# Patient Record
Sex: FEMALE | Race: WHITE | NOT HISPANIC OR LATINO | Employment: UNEMPLOYED | URBAN - METROPOLITAN AREA
[De-identification: names, ages, dates, MRNs, and addresses within clinical notes are randomized per-mention and may not be internally consistent; named-entity substitution may affect disease eponyms.]

---

## 2017-10-07 ENCOUNTER — APPOINTMENT (EMERGENCY)
Dept: RADIOLOGY | Facility: HOSPITAL | Age: 62
End: 2017-10-07
Payer: MEDICARE

## 2017-10-07 ENCOUNTER — HOSPITAL ENCOUNTER (EMERGENCY)
Facility: HOSPITAL | Age: 62
Discharge: HOME | End: 2017-10-07
Attending: EMERGENCY MEDICINE | Admitting: EMERGENCY MEDICINE
Payer: MEDICARE

## 2017-10-07 VITALS
TEMPERATURE: 98.5 F | HEART RATE: 71 BPM | SYSTOLIC BLOOD PRESSURE: 130 MMHG | WEIGHT: 145 LBS | RESPIRATION RATE: 16 BRPM | OXYGEN SATURATION: 99 % | DIASTOLIC BLOOD PRESSURE: 62 MMHG

## 2017-10-07 DIAGNOSIS — J06.9 VIRAL URI: Primary | ICD-10-CM

## 2017-10-07 DIAGNOSIS — B97.89 VIRAL SINUSITIS: ICD-10-CM

## 2017-10-07 DIAGNOSIS — J32.9 VIRAL SINUSITIS: ICD-10-CM

## 2017-10-07 PROCEDURE — 71020 HB CHEST X-RAY 2VW FRONTAL&LATL: CPT

## 2017-10-07 PROCEDURE — 99283 EMERGENCY DEPT VISIT LOW MDM: CPT

## 2017-10-07 RX ORDER — IBUPROFEN 400 MG/1
400 TABLET ORAL ONCE
Status: DISCONTINUED | OUTPATIENT
Start: 2017-10-07 | End: 2017-10-07 | Stop reason: HOSPADM

## 2017-10-07 RX ORDER — PSEUDOEPHEDRINE HCL 60 MG/1
60 TABLET ORAL ONCE
Status: DISCONTINUED | OUTPATIENT
Start: 2017-10-07 | End: 2017-10-07 | Stop reason: HOSPADM

## 2017-10-07 RX ORDER — OXYMETAZOLINE HYDROCHLORIDE 0.05 G/100ML
2 SPRAY NASAL 2 TIMES DAILY
Qty: 30 ML | Refills: 0 | Status: SHIPPED | OUTPATIENT
Start: 2017-10-07 | End: 2018-03-07

## 2017-10-07 NOTE — DISCHARGE INSTRUCTIONS
Viral Syndrome   WHAT YOU NEED TO KNOW:   What is viral syndrome? Viral syndrome is a term used for a viral infection that has no clear cause  Viruses are spread easily from person to person through the air and on shared items  What are the signs and symptoms of viral syndrome? Signs and symptoms may start slowly or suddenly and last hours to days  They can be mild to severe and can change over days or hours  You may have any of the following:  · Fever and chills    · A runny or stuffy nose     · Cough, sore throat, or hoarseness     · Headache, or pain and pressure around your eyes     · Muscle aches and joint pain     · Shortness of breath or wheezing     · Abdominal pain, cramps, and diarrhea     · Nausea, vomiting, or loss of appetite  How is viral syndrome diagnosed and treated? Your healthcare provider will ask about your symptoms and examine you  Tell him about any recent travel or insect bites  An illness caused by a virus usually goes away in 10 to 14 days without treatment  You may need medicine to help manage your symptoms such as fever, muscle aches, cough, or congestion  How can I manage my symptoms? · Drink liquids as directed  to prevent dehydration  Ask how much liquid to drink each day and which liquids are best for you  Ask if you should drink an oral rehydration solution (ORS)  An ORS has the right amounts of water, salts, and sugar you need to replace body fluids  This may help prevent dehydration caused by vomiting or diarrhea  Do not drink liquids with caffeine  Liquids with caffeine can make dehydration worse  · Get plenty of rest  to help your body heal  Take naps throughout the day  Ask your healthcare provider when you can return to work and your normal activities  · Use a cool mist humidifier  to help you breathe easier if you have nasal or chest congestion  Ask your healthcare provider how to use a cool mist humidifier       · Eat honey or use cough drops  to help decrease throat discomfort  Ask your healthcare provider how much honey you should eat each day  Cough drops are available without a doctor's order  Follow directions for taking cough drops  · Do not smoke and stay away from others who smoke  Nicotine and other chemicals in cigarettes and cigars can cause lung damage  Smoking can also delay healing  Ask your healthcare provider for information if you currently smoke and need help to quit  E-cigarettes or smokeless tobacco still contain nicotine  Talk to your healthcare provider before you use these products  · Wash your hands frequently  to prevent the spread of germs to others  Use soap and water  Use gel hand  when soap and water are not available  Wash your hands after you use the bathroom, cough, or sneeze  Wash your hands before you prepare or eat food  Call 911 or have someone else call 911 if:   · You have a seizure  · You cannot be woken  · You have chest pain or trouble breathing  When should I seek immediate care? · You have a stiff neck, a bad headache, and sensitivity to light  · You feel weak, dizzy, or confused  · You stop urinating or urinate a lot less than normal      · You cough up blood or thick, yellow or green, mucus  · You have severe abdominal pain or your abdomen is larger than usual   When should I contact my healthcare provider? · Your symptoms do not get better with treatment or get worse after 3 days  · You have a rash or ear pain  · You have burning when you urinate  · You have questions or concerns about your condition or care  CARE AGREEMENT:   You have the right to help plan your care  Learn about your health condition and how it may be treated  Discuss treatment options with your caregivers to decide what care you want to receive  You always have the right to refuse treatment  The above information is an  only   It is not intended as medical advice for individual conditions or treatments  Talk to your doctor, nurse or pharmacist before following any medical regimen to see if it is safe and effective for you  © 2017 [employer] Information is for End User's use only and may not be sold, redistributed or otherwise used for commercial purposes  All illustrations and images included in CareNotes® are the copyrighted property of [employer]  or Nicholas Hidalgo  Sinusitis   WHAT YOU NEED TO KNOW:   What is sinusitis? Sinusitis is inflammation or infection of your sinuses  It is most often caused by a virus  Acute sinusitis may last up to 12 weeks  Chronic sinusitis lasts longer than 12 weeks  Recurrent sinusitis means you have 4 or more times in 1 year  What increases my risk for sinusitis? · Medical conditions, such as an upper respiratory infection, allergies, asthma, or cystic fibrosis    · Dental infections or procedures, such as gum infections, tooth decay, or a root canal    · Smoking    · Abnormal sinus structure, such as nasal growths, swollen tonsils, or a deviated septum    · A weak immune system, from diseases such as diabetes or HIV  What are the signs and symptoms of sinusitis? · Fever    · Pain, pressure, redness, or swelling around the forehead, cheeks, or eyes    · Thick yellow or green discharge from your nose    · Tenderness when you touch your face over your sinuses    · Dry cough that happens mostly at night or when you lie down    · Headache and face pain that is worse when you lean forward    · Tooth pain, or pain when you chew  How is sinusitis diagnosed? Your healthcare provider will examine you and ask about your symptoms  He or she will check inside your nose using a nasal speculum  This is a small tool used to open your nostrils  A sample of the mucus from your nose may show what germ is causing your infection  How is sinusitis treated? Your symptoms may go away on their own   Your healthcare provider may recommend watchful waiting for up to 10 days before starting antibiotics  You may  need any of the following:  · Acetaminophen  decreases pain and fever  It is available without a doctor's order  Ask how much to take and how often to take it  Follow directions  Read the labels of all other medicines you are using to see if they also contain acetaminophen, or ask your doctor or pharmacist  Acetaminophen can cause liver damage if not taken correctly  Do not use more than 4 grams (4,000 milligrams) total of acetaminophen in one day  · NSAIDs , such as ibuprofen, help decrease swelling, pain, and fever  This medicine is available with or without a doctor's order  NSAIDs can cause stomach bleeding or kidney problems in certain people  If you take blood thinner medicine, always ask your healthcare provider if NSAIDs are safe for you  Always read the medicine label and follow directions  · Nasal steroid sprays  may help decrease inflammation in your nose and sinuses  · Decongestants  help reduce swelling and drain mucus in the nose and sinuses  They may help you breathe easier  · Antihistamines  help dry mucus in the nose and relieve sneezing  · Antibiotics  help treat or prevent a bacterial infection  How can I manage my symptoms? · Rinse your sinuses  Use a sinus rinse device to rinse your nasal passages with a saline (salt water) solution or distilled water  Do not use tap water  This will help thin the mucus in your nose and rinse away pollen and dirt  It will also help reduce swelling so you can breathe normally  Ask your healthcare provider how often to do this  · Breathe in steam   Heat a bowl of water until you see steam  Lean over the bowl and make a tent over your head with a large towel  Breathe deeply for about 20 minutes  Be careful not to get too close to the steam or burn yourself  Do this 3 times a day  You can also breathe deeply when you take a hot shower       · Sleep with your head elevated  Place an extra pillow under your head before you go to sleep to help your sinuses drain  · Drink liquids as directed  Ask your healthcare provider how much liquid to drink each day and which liquids are best for you  Liquids will thin the mucus in your nose and help it drain  Avoid drinks that contain alcohol or caffeine  · Do not smoke, and avoid secondhand smoke  Nicotine and other chemicals in cigarettes and cigars can make your symptoms worse  Ask your healthcare provider for information if you currently smoke and need help to quit  E-cigarettes or smokeless tobacco still contain nicotine  Talk to your healthcare provider before you use these products  How can I help prevent the spread of germs that cause sinusitis? Wash your hands often with soap and water  Wash your hands after you use the bathroom, change a child's diaper, or sneeze  Wash your hands before you prepare or eat food  When should I seek immediate care? · Your eye and eyelid are red, swollen, and painful  · You cannot open your eye  · You have vision changes, such as double vision  · Your eyeball bulges out or you cannot move your eye  · You are more sleepy than normal, or you notice changes in your ability to think, move, or talk  · You have a stiff neck, a fever, or a bad headache  · You have swelling of your forehead or scalp  When should I contact my healthcare provider? · Your symptoms do not improve after 3 days  · Your symptoms do not go away after 10 days  · You have nausea and are vomiting  · Your nose is bleeding  · You have questions or concerns about your condition or care  CARE AGREEMENT:   You have the right to help plan your care  Learn about your health condition and how it may be treated  Discuss treatment options with your caregivers to decide what care you want to receive  You always have the right to refuse treatment   The above information is an  only  It is not intended as medical advice for individual conditions or treatments  Talk to your doctor, nurse or pharmacist before following any medical regimen to see if it is safe and effective for you  © 2017 [employer] Information is for End User's use only and may not be sold, redistributed or otherwise used for commercial purposes  All illustrations and images included in CareNotes® are the copyrighted property of [employer]  or Nicholas Hidalgo  Oxymetazoline (Into the nose)   Oxymetazoline ([id number]-v-dg-MICHELE-oh-monroe)  Helps relieve a stuffy nose  Brand Name(s): 12-Hour Nasal, 4-Way Long Lasting, Afrin, Afrin Extra Moisturizing, Afrin No-Drip Sinus, Afrin PumpMist, Afrin Sinus, Afrin w/Menthol, Dristan 12-Hr, Duramist Plus, Good Neighbor Nasal [place] Pump, [facility] Nasal [place], Good Sense Nasal Spray, Mucinex Full Force, Mucinex Moisture Smart   There may be other brand names for this medicine  When This Medicine Should Not Be Used: This medicine is not right for everyone  Do not use it if you had an allergic reaction to oxymetazoline  How to Use This Medicine:   Spray  · Your doctor will tell you how much medicine to use  Do not use more than directed  · Follow the instructions on the medicine label if you are using this medicine without a prescription  · If you are using the nasal spray for the first time, you will need to prime the spray  To do this, pump or squeeze the bottle until some of the medicine sprays out  Now it is ready to use  Prime the spray after each time you clean the pump, or if you have not used the medicine for 5 days or longer  · Shake the nasal spray well before each use  · Before using the medicine, gently blow your nose to clear the nostrils  · Keep your head upright while spraying the medicine into each nostril  Repeat until you have used 2 to 3 sprays in each nostril    · Do not use more than two times in 24 hours, unless your doctor tells you to  · Do not share this medicine with other people  · After using the nasal spray, wipe the tip of the bottle with a clean tissue and put the cap back on   · Missed dose: Take a dose as soon as you remember  If it is almost time for your next dose, wait until then and take a regular dose  Do not take extra medicine to make up for a missed dose  · Keep the bottle tightly closed when not using it  Store at room temperature, away from heat and direct light  Drugs and Foods to Avoid:      Ask your doctor or pharmacist before using any other medicine, including over-the-counter medicines, vitamins, and herbal products  Warnings While Using This Medicine:   · Tell your doctor if you are pregnant or breastfeeding, or if you have heart disease, high blood pressure, diabetes, thyroid problems, or an enlarged prostate  · Do not use this medicine for more than 3 days unless your doctor tell you to  · Call your doctor if your symptoms do not improve or if they get worse  · Keep all medicine out of the reach of children  Never share your medicine with anyone  Possible Side Effects While Using This Medicine:   Call your doctor right away if you notice any of these side effects:  · Allergic reaction: Itching or hives, swelling in your face or hands, swelling or tingling in your mouth or throat, chest tightness, trouble breathing  If you notice these less serious side effects, talk with your doctor:   · Burning or stinging inside of your nose  If you notice other side effects that you think are caused by this medicine, tell your doctor  Call your doctor for medical advice about side effects  You may report side effects to FDA at 3-231-FDA-2141  © 2017 [employer] Information is for End User's use only and may not be sold, redistributed or otherwise used for commercial purposes  The above information is an  only   It is not intended as medical advice for individual conditions or treatments  Talk to your doctor, nurse or pharmacist before following any medical regimen to see if it is safe and effective for you

## 2017-10-07 NOTE — ED PROVIDER NOTES
History  Chief Complaint   Patient presents with    URI     cough, with yellow colored sputum, chest congestion , ears feel blocked     HPI  This is a 55-year-old female with history of hypertension presenting to the emergency room for evaluation of cough and congestion  Coming in with one week history of progressive cough with sputum, frontal headache, clogged ears, red eyes  She states initially she had the UR symptoms without the cough, and the cough started about 2 days ago  Sputum is a frothy white, which occurs on occasion  Patient presented today because she thought symptoms would have resolved by now  Grandson had ear infection 1 week ago  Tolerating PO, no fevers, has night sweats, no diarrhea, no cp, no SOB, no urinary symptoms  Takes mucinex with minimal relief  Did not get her flu shot  No recent travel, denies smoking  None       Past Medical History:   Diagnosis Date    Anxiety     Depression     Hypertension     Psychiatric disorder        History reviewed  No pertinent surgical history  History reviewed  No pertinent family history  I have reviewed and agree with the history as documented  Social History   Substance Use Topics    Smoking status: Never Smoker    Smokeless tobacco: Never Used    Alcohol use No        Review of Systems    Constitutional: Negative for appetite change, chills and fever  HENT: Negative for congestion, rhinorrhea and sore throat  Eyes: Negative for photophobia, pain and visual disturbance  Respiratory: Negative for cough, chest tightness and shortness of breath  Cardiovascular: Negative for chest pain, palpitations and leg swelling  Gastrointestinal: Negative for abdominal pain, diarrhea, nausea and vomiting  Genitourinary: Negative for dysuria, flank pain and hematuria  Musculoskeletal: Negative for back pain, neck pain and neck stiffness  Skin: Negative for color change, rash and wound     Neurological: Negative for dizziness, numbness and headaches  All other systems reviewed and are negative  Physical Exam  ED Triage Vitals   Temperature Pulse Respirations Blood Pressure SpO2   10/07/17 1215 10/07/17 1215 10/07/17 1215 10/07/17 1215 10/07/17 1215   98 5 °F (36 9 °C) 76 18 126/60 99 %      Temp Source Heart Rate Source Patient Position - Orthostatic VS BP Location FiO2 (%)   10/07/17 1215 10/07/17 1309 10/07/17 1309 -- --   Oral Monitor Lying        Pain Score       10/07/17 1215       4           Physical Exam  /62   Pulse 71   Temp 98 5 °F (36 9 °C) (Oral)   Resp 16   Wt 65 8 kg (145 lb)   SpO2 99%     General Appearance:  Alert, cooperative, no distress, appears stated age   Head:  Normocephalic, without obvious abnormality, atraumatic   Eyes:  PERRL, conjunctiva/corneas clear, EOM's intact   Ears:  Right TM with some mild erythema, no bulging fluid  Left TM is normal appearing     Nose: Nares normal, septum midline,mucosa normal, no drainage, there is maxillary sinus tenderness   Throat: Lips, mucosa, and tongue normal; teeth and gums normal   Neck: Supple, symmetrical, trachea midline, no adenopathy   Back:   Symmetric, no curvature, ROM normal, no CVA tenderness   Lungs:   Clear to auscultation bilaterally, respirations unlabored   Heart:  Regular rate and rhythm, S1 and S2 normal, no murmur, rub, or gallop   Abdomen:   Soft, non-tender, bowel sounds active all four quadrants   Pelvic: Deferred   Extremities: Extremities normal, atraumatic, no cyanosis or edema   Pulses: 2+ and symmetric   Skin: Skin color, texture, turgor normal, no rashes or lesions   Neurologic:      Psychiatric: Moves all extremities, sensation and strength in tact in all extremities    Normal mood and affect         ED Medications  Medications   ibuprofen (MOTRIN) tablet 400 mg (400 mg Oral Not Given 10/7/17 1337)   pseudoephedrine (SUDAFED) tablet 60 mg (60 mg Oral Not Given 10/7/17 1337)       Diagnostic Studies  Labs Reviewed - No data to display    XR chest 2 views   ED Interpretation   No acute disease  Final Result      No active pulmonary disease  Workstation performed: [id number]             Procedures  Procedures      Phone Consults  ED Phone Contact    ED Course  ED Course            MDM   Patient likely with viral upper respiratory infection with acute viral sinusitis  Given patient's age, we will check a chest x-ray for pneumonia  If it is negative for pneumonia, we will continue supportive care  Follow up with PCP next week  CritCare Time    Disposition  Final diagnoses:   Viral URI   Viral sinusitis     ED Disposition     ED Disposition Condition Comment    Discharge  Robinhortensia Annganesh Handy discharge to home/self care  Condition at discharge: Stable        Follow-up Information     Follow up With Specialties Details Why Contact Info Additional [address], [address] Nurse Practitioner Schedule an appointment as soon as possible for a visit in 2 days  [facility] [phone]       [address] [place] [address] Emergency Department Emergency Medicine  If symptoms worsen [address]  [phone]  ED, [address] [place] [address], [phone]        Discharge Medication List as of 10/7/2017  1:28 PM      START taking these medications    Details   oxymetazoline (AFRIN) 0 05 % nasal spray 2 sprays by Each Nare route 2 (two) times a day for 3 days, Starting Sat 10/7/2017, Until Tue 10/10/2017, Print           No discharge procedures on file  ED Provider  Attending physically available and evaluated Huseyin Pandya I managed the patient along with the ED Attending      Electronically Signed by       Sondra Sicard, MD  Resident  10/07/17 4948

## 2017-10-07 NOTE — ED ATTENDING ATTESTATION
Liam Romero MD, saw and evaluated the patient  I have discussed the patient with the resident/non-physician practitioner and agree with the resident's/non-physician practitioner's findings, Plan of Care, and MDM as documented in the resident's/non-physician practitioner's note, except where noted  All available labs and Radiology studies were reviewed  At this point I agree with the current assessment done in the Emergency Department  I have conducted an independent evaluation of this patient including a focused history and a physical exam     33-year-old female, presenting to the emergency department for evaluation of a chief complaint of cough  Patient states that she 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, who was diagnosed with a viral infection and subsequently ear infection  The patient states that over the past several days she is experiencing nasal congestion, sore throat, and over the past 2 days has experienced cough which she notes is productive of purulent sputum  The patient notes that her cough is particularly worse at night  The patient denies any fever, however states that at night she has been woken up with sweats  Patient denies any headache, chest pain, significant shortness of breath, abdominal pain, nausea, vomiting, diarrhea  Ten systems reviewed negative except as noted in the history of present illness  The patient is resting comfortably on a stretcher in no acute respiratory distress  The patient appears nontoxic  HEENT reveals moist mucous membranes  Head is normocephalic and atraumatic  Conjunctiva and sclera are normal  Neck is nontender and supple with full range of motion to flexion, extension, lateral rotation  No meningismus appreciated  No masses are appreciated  Lungs are clear to auscultation bilaterally without any wheezes, rales or rhonchi  Heart is regular rate and rhythm without any murmurs, rubs or gallops   Abdomen is soft and nontender without any rebound or guarding  Extremities appear grossly normal without any significant arthropathy  Patient is awake, alert, and oriented x3  The patient has normal interaction  Motor is 5 out of 5  Assessment and plan:  49-year-old female presenting to the emergency department for evaluation of cough  Chest x-ray to exclude pneumonia  Patient has an unremarkable exam   This likely represents viral illness  XR chest 2 views   ED Interpretation   No acute disease  Final Result      No active pulmonary disease            Workstation performed: [id number]

## 2018-03-07 ENCOUNTER — HOSPITAL ENCOUNTER (EMERGENCY)
Facility: HOSPITAL | Age: 63
Discharge: HOME | End: 2018-03-07
Attending: EMERGENCY MEDICINE
Payer: MEDICARE

## 2018-03-07 VITALS
WEIGHT: 140 LBS | RESPIRATION RATE: 18 BRPM | BODY MASS INDEX: 22.5 KG/M2 | DIASTOLIC BLOOD PRESSURE: 53 MMHG | TEMPERATURE: 98.4 F | HEART RATE: 76 BPM | HEIGHT: 66 IN | SYSTOLIC BLOOD PRESSURE: 113 MMHG | OXYGEN SATURATION: 94 %

## 2018-03-07 DIAGNOSIS — M54.50 LUMBAR BACK PAIN: Primary | ICD-10-CM

## 2018-03-07 PROCEDURE — 96372 THER/PROPH/DIAG INJ SC/IM: CPT

## 2018-03-07 PROCEDURE — 99283 EMERGENCY DEPT VISIT LOW MDM: CPT

## 2018-03-07 RX ORDER — DIAZEPAM 5 MG/1
5 TABLET ORAL ONCE
Status: COMPLETED | OUTPATIENT
Start: 2018-03-07 | End: 2018-03-07

## 2018-03-07 RX ORDER — DIAZEPAM 5 MG/1
5 TABLET ORAL 2 TIMES DAILY
Qty: 10 TABLET | Refills: 0 | Status: SHIPPED | OUTPATIENT
Start: 2018-03-07 | End: 2018-03-17

## 2018-03-07 RX ORDER — LIDOCAINE 50 MG/G
1 PATCH TOPICAL ONCE
Status: DISCONTINUED | OUTPATIENT
Start: 2018-03-07 | End: 2018-03-07 | Stop reason: HOSPADM

## 2018-03-07 RX ORDER — LIDOCAINE 50 MG/G
1 PATCH TOPICAL DAILY
Qty: 30 PATCH | Refills: 0 | Status: SHIPPED | OUTPATIENT
Start: 2018-03-07

## 2018-03-07 RX ORDER — KETOROLAC TROMETHAMINE 30 MG/ML
15 INJECTION, SOLUTION INTRAMUSCULAR; INTRAVENOUS ONCE
Status: COMPLETED | OUTPATIENT
Start: 2018-03-07 | End: 2018-03-07

## 2018-03-07 RX ORDER — LISINOPRIL 2.5 MG/1
2.5 TABLET ORAL DAILY
COMMUNITY

## 2018-03-07 RX ORDER — NAPROXEN 500 MG/1
500 TABLET ORAL 2 TIMES DAILY WITH MEALS
Qty: 30 TABLET | Refills: 0 | Status: SHIPPED | OUTPATIENT
Start: 2018-03-07

## 2018-03-07 RX ADMIN — LIDOCAINE 1 PATCH: 50 PATCH TOPICAL at 13:26

## 2018-03-07 RX ADMIN — KETOROLAC TROMETHAMINE 15 MG: 30 INJECTION, SOLUTION INTRAMUSCULAR at 13:22

## 2018-03-07 RX ADMIN — DIAZEPAM 5 MG: 5 TABLET ORAL at 13:23

## 2018-03-07 NOTE — ED PROVIDER NOTES
History  Chief Complaint   Patient presents with    Back Injury     Pt Pt slipped yesterday, didnt fall, but felt something 'slip out' in her back  Hx of back problems     40-year-old female presenting to the ER today with lumbar back pain  Patient states she almost fell after tripping on ice yesterday  Patient was able to prevent the fall but braced herself in such a way that she experience pain in the right lumbar spine  Pain has continued despite Tylenol  Came to the ER today for further evaluation and treatment  Denies saddle anesthesia, bowel or bladder incontinence, radiation of the pain down legs  History provided by:  Patient  Back Pain   Location:  Lumbar spine and gluteal region  Quality:  Cramping and stiffness  Stiffness is present:  All day  Radiates to:  Does not radiate  Pain severity:  Moderate  Pain is:  Unable to specify  Onset quality:  Gradual  Timing:  Constant  Progression:  Unchanged  Chronicity:  New  Relieved by:  Nothing  Worsened by:  Nothing  Ineffective treatments:  None tried  Associated symptoms: no abdominal pain, no dysuria, no fever and no pelvic pain        Prior to Admission Medications   Prescriptions Last Dose Informant Patient Reported? Taking?   lisinopril (ZESTRIL) 2 5 mg tablet 3/6/2018 at Unknown time  Yes Yes   Sig: Take 2 5 mg by mouth daily      Facility-Administered Medications: None       Past Medical History:   Diagnosis Date    Anxiety     Depression     Hypertension     Psychiatric disorder        History reviewed  No pertinent surgical history  History reviewed  No pertinent family history  I have reviewed and agree with the history as documented  Social History   Substance Use Topics    Smoking status: Never Smoker    Smokeless tobacco: Never Used    Alcohol use No        Review of Systems   Constitutional: Negative  Negative for appetite change, chills, diaphoresis, fatigue and fever  HENT: Negative  Eyes: Negative  Respiratory: Negative  Cardiovascular: Negative  Gastrointestinal: Negative for abdominal pain, blood in stool, constipation, diarrhea, nausea and vomiting  Endocrine: Negative  Genitourinary: Negative for decreased urine volume, difficulty urinating, dyspareunia, dysuria, flank pain, frequency, hematuria, pelvic pain, urgency, vaginal bleeding, vaginal discharge and vaginal pain  Musculoskeletal: Positive for back pain  Skin: Negative  Allergic/Immunologic: Negative  Neurological: Negative  Psychiatric/Behavioral: Negative  All other systems reviewed and are negative  Physical Exam  ED Triage Vitals [03/07/18 1228]   Temperature Pulse Respirations Blood Pressure SpO2   98 4 °F (36 9 °C) 84 18 117/54 97 %      Temp Source Heart Rate Source Patient Position - Orthostatic VS BP Location FiO2 (%)   Oral Monitor Lying Right arm --      Pain Score       5           Orthostatic Vital Signs  Vitals:    03/07/18 1228 03/07/18 1339 03/07/18 1430 03/07/18 1439   BP: 117/54 120/58 113/53 113/53   Pulse: 84 69 66 76   Patient Position - Orthostatic VS: Lying Lying  Lying       Physical Exam   Constitutional: She is oriented to person, place, and time  She appears well-developed and well-nourished  HENT:   Head: Normocephalic and atraumatic  Right Ear: External ear normal    Left Ear: External ear normal    Nose: Nose normal    Mouth/Throat: Oropharynx is clear and moist    Eyes: Conjunctivae and EOM are normal  Pupils are equal, round, and reactive to light  Neck: Normal range of motion  Neck supple  No JVD present  No tracheal deviation present  No thyromegaly present  Cardiovascular: Normal rate, regular rhythm, normal heart sounds and intact distal pulses  Exam reveals no gallop and no friction rub  No murmur heard  Pulmonary/Chest: Effort normal and breath sounds normal  No stridor  No respiratory distress  She has no wheezes  She has no rales  She exhibits no tenderness  Abdominal: Soft  Bowel sounds are normal  She exhibits no distension and no mass  There is no tenderness  There is no rebound and no guarding  No hernia  Musculoskeletal: Normal range of motion  She exhibits tenderness  She exhibits no edema or deformity  Back:    Lymphadenopathy:     She has no cervical adenopathy  Neurological: She is alert and oriented to person, place, and time  She has normal reflexes  She displays normal reflexes  No cranial nerve deficit  She exhibits normal muscle tone  Coordination normal    Skin: Skin is warm  No rash noted  No erythema  No pallor  Psychiatric: She has a normal mood and affect  Her behavior is normal  Judgment and thought content normal    Nursing note and vitals reviewed  ED Medications  Medications   ketorolac (TORADOL) injection 15 mg (15 mg Intramuscular Given 3/7/18 1322)   diazepam (VALIUM) tablet 5 mg (5 mg Oral Given 3/7/18 1323)       Diagnostic Studies  Results Reviewed     None                 No orders to display         Procedures  Procedures      Phone Consults  ED Phone Contact    ED Course  ED Course as of Mar 08 1215   Wed Mar 07, 2018   1446 Patient states symptoms haveCompletely improved  Patient would like to go home now  Will discharge at this time  Identification of Seniors at [address] [place] [address] Most Recent Value   (ISAR) Identification of Seniors at Risk   Before the illness or injury that brought you to the Emergency, did you need someone to help you on a regular basis? 0 Filed at: 03/07/2018 1230   In the last 24 hours, have you needed more help than usual?  1 Filed at: 03/07/2018 1230   Have you been hospitalized for one or more nights during the past 6 months? 0 Filed at: 03/07/2018 1230   In general, do you see well?  0 Filed at: 03/07/2018 1230   In general, do you have serious problems with your memory?   0 Filed at: 03/07/2018 1230   Do you take more than three different medications every day?  0 Filed at: 03/07/2018 1230   ISAR Score  1 Filed at: 03/07/2018 1230                          [facility]  Number of Diagnoses or Management Options  Lumbar back pain: new and requires workup     Amount and/or Complexity of Data Reviewed  Clinical lab tests: ordered and reviewed  Tests in the radiology section of CPT®: ordered and reviewed  Tests in the medicine section of CPT®: ordered and reviewed  Decide to obtain previous medical records or to obtain history from someone other than the patient: yes  Independent visualization of images, tracings, or specimens: yes    Patient Progress  Patient progress: stable    CritCare Time    Disposition  Final diagnoses:   Lumbar back pain     Time reflects when diagnosis was documented in both MDM as applicable and the Disposition within this note     Time User Action Codes Description Comment    3/7/2018  2:47 PM Jose G Ruby Add [M54 5] Lumbar back pain       ED Disposition     ED Disposition Condition Comment    Discharge  [address] discharge to home/self care      Condition at discharge: Good        Follow-up Information     Follow up With Specialties Details Why [id number] [facility], [facility] Nurse Practitioner Schedule an appointment as soon as possible for a visit  [address]  [phone]          Discharge Medication List as of 3/7/2018  2:48 PM      START taking these medications    Details   diazepam (VALIUM) 5 mg tablet Take 1 tablet (5 mg total) by mouth 2 (two) times a day for 10 days, Starting Wed 3/7/2018, Until Sat 3/17/2018, Print      lidocaine (LIDODERM) 5 % Place 1 patch on the skin daily Remove & Discard patch within 12 hours or as directed by MD, Starting Wed 3/7/2018, Print      naproxen (NAPROSYN) 500 mg tablet Take 1 tablet (500 mg total) by mouth 2 (two) times a day with meals, Starting Wed 3/7/2018, Print         CONTINUE these medications which have NOT CHANGED    Details   lisinopril (ZESTRIL) 2 5 mg tablet Take 2 5 mg by mouth daily, Historical Med           No discharge procedures on file  ED Provider  Attending physically available and evaluated Moira Kacey CHOWDHURY managed the patient along with the ED Attending      Electronically Signed by         Michelle Correa DO  03/08/18 0614

## 2018-03-07 NOTE — ED ATTENDING ATTESTATION
Doris Farr MD, saw and evaluated the patient  All available labs and X-rays were ordered by me or the resident and have been reviewed by myself  I discussed the patient with the resident / non-physician and agree with the resident's / non-physician practitioner's findings and plan as documented in the resident's / non-physician practicitioner's note, except where noted  At this point, I agree with the current assessment done in the ED  Chief Complaint   Patient presents with    Back Injury     Pt Pt slipped yesterday, didnt fall, but felt something 'slip out' in her back  Hx of back problems     This is a 70year old female presenting for evaluation of back pain  The patient was ambulating yesterday, slipped on ice, and twisted her back  She has been having right paravertebral lumbar+sacral pain, worse with movement, better wit hrest  Denies f/ch/nv/cp/sob  No saddle anesthesia  No changes in bowel movement  No bowel/bladder incontinence  Denies any urinary tract infection symptoms (burning, itching, pain, blood, frequency)  Denies similar in the past   Denies saddle anesthesia or changes with wiping with toilet paper  Denies direct back trauma  No meds used  PMH:  - htn  - depression/anxiety  PSH:  - none  No smoking, drinking, drugs  PE:  Vitals:    03/07/18 1228 03/07/18 1339 03/07/18 1430 03/07/18 1439   BP: 117/54 120/58 113/53 113/53   BP Location: Right arm Right arm  Right arm   Pulse: 84 69 66 76   Resp: 18 18 16 18   Temp: 98 4 °F (36 9 °C)      TempSrc: Oral      SpO2: 97% 94% 95% 94%   Weight: 63 5 kg (140 lb)      Height: 5' 6" (1 676 m)      General: VS reviewed  Appears in NAD  awake, alert  Well-nourished, well-developed  Appears stated age  Speaking normally in full sentences  Head: Normocephalic, atraumatic, nontender  Eyes: EOM-I  No diplopia  No hyphema  No subconjunctival hemorrhages  Symmetrical lids  ENT: Atraumatic external nose and ears      MMM  No malocclusion  No stridor  Normal phonation  No drooling  Normal swallowing  Neck: No JVD  CV: No pallor noted  Peripheral pulses +2 throughout  No chest wall tenderness  Lungs:   No tachypnea  No respiratory distress  MSK:   FROM   Right buttocks tenderness  Paravertebral right lumbar tenderness, right sacral tenderness  I can't localize it to a single muscle group for TPI  SLR negative  No saddle anesthesia  EHL FHL PF DF 5/5  I can passively range knees and hips b/l but causes pain in the back  Skin: Dry, intact  Neuro: Awake, alert, GCS15, CN II-XII grossly intact  Motor grossly intact  Psychiatric/Behavioral: Appropriate mood and affect   Exam: deferred  A:  - muscle spasm  P:  - toradol  - lidoderm patch  - valium  - short course of narcotics if needed  - exam/hx consistent with muscle spasm / MSK pain / MFPS  - 13 point ROS was performed and all are normal unless stated in the history above  - Nursing note reviewed  Vitals reviewed  - Orders placed by myself and/or advanced practitioner / resident     - Previous chart was not reviewed  - No language barrier    - History obtained from patient  - There are no limitations to the history obtained  - Critical care time: Not applicable for this patient  Final Diagnosis:  1  Lumbar back pain      ED Course as of Mar 07 2123   Wed Mar 07, 2018   1459 Patient is had complete resolution of symptoms  Will be sent home with short course of Valium  Medications   ketorolac (TORADOL) injection 15 mg (15 mg Intramuscular Given 3/7/18 1322)   diazepam (VALIUM) tablet 5 mg (5 mg Oral Given 3/7/18 1323)     No orders to display     No orders of the defined types were placed in this encounter      Labs Reviewed - No data to display  Time reflects when diagnosis was documented in both MDM as applicable and the Disposition within this note     Time User Action Codes Description Comment    3/7/2018  2:47 PM Waddell Beam Add [M54 5] Lumbar back pain       ED Disposition     ED Disposition Condition Comment    Discharge  Karolinalynnette Enriqueta discharge to home/self care  Condition at discharge: Good        Follow-up Information     Follow up With Specialties Details Why [address], [address] [facility] Nurse Practitioner Schedule an appointment as soon as possible for a visit  [address]  [phone]          Discharge Medication List as of 3/7/2018  2:48 PM      START taking these medications    Details   diazepam (VALIUM) 5 mg tablet Take 1 tablet (5 mg total) by mouth 2 (two) times a day for 10 days, Starting Wed 3/7/2018, Until Sat 3/17/2018, Print      lidocaine (LIDODERM) 5 % Place 1 patch on the skin daily Remove & Discard patch within 12 hours or as directed by MD, Starting Wed 3/7/2018, Print      naproxen (NAPROSYN) 500 mg tablet Take 1 tablet (500 mg total) by mouth 2 (two) times a day with meals, Starting Wed 3/7/2018, Print         CONTINUE these medications which have NOT CHANGED    Details   lisinopril (ZESTRIL) 2 5 mg tablet Take 2 5 mg by mouth daily, Historical Med           No discharge procedures on file  Prior to Admission Medications   Prescriptions Last Dose Informant Patient Reported? Taking?   lisinopril (ZESTRIL) 2 5 mg tablet 3/6/2018 at Unknown time  Yes Yes   Sig: Take 2 5 mg by mouth daily      Facility-Administered Medications: None       Portions of the record may have been created with voice recognition software  Occasional wrong word or "sound a like" substitutions may have occurred due to the inherent limitations of voice recognition software  Read the chart carefully and recognize, using context, where substitutions have occurred      Electronically signed by:  Gita Tamez

## 2018-03-07 NOTE — ED NOTES
Pt reports improvement with pain medication, is able to move legs and back now       Vasquez Santos RN  10/07/80 7407

## 2018-03-07 NOTE — DISCHARGE INSTRUCTIONS
Acute Low Back Pain, Ambulatory Care   GENERAL INFORMATION:   Acute low back pain  is discomfort in your lower back area that lasts for less than 12 weeks  The word acute is used to describe pain that starts suddenly, worsens quickly, and lasts for a short time  Common symptoms include the following:   · Back stiffness or spasms    · Pain down the back or side of one leg    · Holding yourself in an unusual position or posture to decrease your back pain    · Not being able to find a sitting position that is comfortable    · Slow increase in your pain for 24 to 48 hours after you stress your back    · Tenderness on your lower back or severe pain when you move your back  Seek immediate care for the following symptoms:   · Severe pain    · Sudden stiffness and heaviness in both buttocks down to both legs    · Numbness or weakness in one leg, or pain in both legs    · Numbness in your genital area or across your lower back    · Unable to control your urine or bowel movements  Treatment for acute low back pain  may include any of the following:  · Medicines:      ¨ NSAIDs  help decrease swelling and pain or fever  This medicine is available with or without a doctor's order  NSAIDs can cause stomach bleeding or kidney problems in certain people  If you take blood thinner medicine, always ask your healthcare provider if NSAIDs are safe for you  Always read the medicine label and follow directions  ¨ Muscle relaxers  help decrease muscle spasms pain  ¨ Prescription pain medicine  may be given  Ask how to take this medicine safely  · Surgery  may be needed if your pain is severe and other treatments do not work  Surgery may be needed for conditions of the lumbar spine, such as herniated disc or spinal stenosis  Manage your symptoms:   · Sleep on a firm mattress  If you do not have a firm mattress, have someone move your mattress to the floor for a few days   A piece of plywood under your mattress can also help make it firmer  · Apply ice  on your lower back for 15 to 20 minutes every hour or as directed  Use an ice pack, or put crushed ice in a plastic bag  Cover it with a towel  Ice helps prevent tissue damage and decreases swelling and pain  You can alternate ice and heat  · Apply heat  on your lower back for 20 to 30 minutes every 2 hours for as many days as directed  Heat helps decrease pain and muscle spasms  · Go to physical therapy  A physical therapist teaches you exercises to help improve movement and strength, and to decrease pain  Prevent acute low back pain:   · Use proper body mechanics  ¨ Bend at the hips and knees when you  objects  Do not bend from the waist  Use your leg muscles as you lift the load  Do not use your back  Keep the object close to your chest as you lift it  Try not to twist or lift anything above your waist     ¨ Change your position often when you stand for long periods of time  Rest one foot on a small box or footrest, and then switch to the other foot often  ¨ Try not to sit for long periods of time  When you do, sit in a straight-backed chair with your feet flat on the floor  Never reach, pull, or push while you are sitting  · Exercise regularly  Warm up before you exercise  Do exercises that strengthen your back muscles  Ask about the best exercise plan for you  · Maintain a healthy weight  Ask your healthcare provider how much you should weigh  Ask him to help you create a weight loss plan if you are overweight  Follow up with your healthcare provider as directed:  Return for a follow-up visit if you still have pain after 1 to 3 weeks of treatment  You may need to visit an orthopedist if your back pain lasts more than 6 to 12 weeks  Write down your questions so you remember to ask them during your visits  CARE AGREEMENT:   You have the right to help plan your care  Learn about your health condition and how it may be treated   Discuss treatment options with your caregivers to decide what care you want to receive  You always have the right to refuse treatment  The above information is an  only  It is not intended as medical advice for individual conditions or treatments  Talk to your doctor, nurse or pharmacist before following any medical regimen to see if it is safe and effective for you  © 2014 [address] is for End User's use only and may not be sold, redistributed or otherwise used for commercial purposes  All illustrations and images included in CareNotes® are the copyrighted property of A [employer]  or Nicholas Hidalgo

## 2018-10-22 ENCOUNTER — TRANSCRIBE ORDERS (OUTPATIENT)
Dept: ADMINISTRATIVE | Facility: HOSPITAL | Age: 63
End: 2018-10-22

## 2018-10-22 DIAGNOSIS — Z12.39 SCREENING BREAST EXAMINATION: Primary | ICD-10-CM

## 2018-10-25 ENCOUNTER — HOSPITAL ENCOUNTER (OUTPATIENT)
Dept: RADIOLOGY | Facility: HOSPITAL | Age: 63
Discharge: HOME | End: 2018-10-25
Payer: MEDICARE

## 2018-10-25 DIAGNOSIS — Z12.39 SCREENING BREAST EXAMINATION: ICD-10-CM

## 2018-10-25 PROCEDURE — 77067 SCR MAMMO BI INCL CAD: CPT

## 2020-10-28 ENCOUNTER — TRANSCRIBE ORDERS (OUTPATIENT)
Dept: ADMINISTRATIVE | Facility: HOSPITAL | Age: 65
End: 2020-10-28

## 2020-10-28 DIAGNOSIS — Z12.31 ENCOUNTER FOR SCREENING MAMMOGRAM FOR MALIGNANT NEOPLASM OF BREAST: Primary | ICD-10-CM

## 2021-03-11 ENCOUNTER — TRANSCRIBE ORDERS (OUTPATIENT)
Dept: ADMINISTRATIVE | Facility: HOSPITAL | Age: 66
End: 2021-03-11

## 2021-03-26 ENCOUNTER — HOSPITAL ENCOUNTER (OUTPATIENT)
Dept: MAMMOGRAPHY | Facility: MEDICAL CENTER | Age: 66
Discharge: HOME | End: 2021-03-26
Payer: MEDICARE

## 2021-03-26 VITALS — WEIGHT: 140 LBS | HEIGHT: 66 IN | BODY MASS INDEX: 22.5 KG/M2

## 2021-03-26 DIAGNOSIS — Z12.31 ENCOUNTER FOR SCREENING MAMMOGRAM FOR MALIGNANT NEOPLASM OF BREAST: ICD-10-CM

## 2021-03-26 PROCEDURE — 77067 SCR MAMMO BI INCL CAD: CPT

## 2021-03-26 PROCEDURE — 77063 BREAST TOMOSYNTHESIS BI: CPT

## 2022-04-12 ENCOUNTER — HOSPITAL ENCOUNTER (OUTPATIENT)
Dept: MAMMOGRAPHY | Facility: MEDICAL CENTER | Age: 67
Discharge: HOME | End: 2022-04-12
Payer: MEDICARE

## 2022-04-12 VITALS — BODY MASS INDEX: 22.5 KG/M2 | WEIGHT: 140 LBS | HEIGHT: 66 IN

## 2022-04-12 DIAGNOSIS — Z12.31 ENCOUNTER FOR SCREENING MAMMOGRAM FOR MALIGNANT NEOPLASM OF BREAST: ICD-10-CM

## 2022-04-12 PROCEDURE — 77063 BREAST TOMOSYNTHESIS BI: CPT

## 2022-04-12 PROCEDURE — 77067 SCR MAMMO BI INCL CAD: CPT
